# Patient Record
(demographics unavailable — no encounter records)

---

## 2024-12-04 NOTE — DISCUSSION/SUMMARY
[de-identified] : Mr Moore has symptomatic DJD In his right knee. He has had some improvement with a recent cortisone injection. He wiill resume PT for the next few weeks. IF unimproved we will consider an HA injection as a next step.  He will increase his activities as tolerated. all questions were answered. we will see him back in one month for reevaluation. He will call if any issues arise

## 2024-12-04 NOTE — HISTORY OF PRESENT ILLNESS
[de-identified] : Karan Moore is a 69 yo gentleman who presents with ongojng right knee issues since August. He developed a sudden onset of right knee pain and swelling. He had difficulty walking and was seen by Dr Cobian. He had his knee aspirated and was sent to PT which only caused increased pain. he had a cortisone injection three weeks ago which provided some relief He had an MRI which shows a medial meniscus tear and medial compartment DJD . He has had ongoing stiffness and limited mobility. He has been using a cane for support. He denies any locking or buckling. He has taken Tylenol without improvement. He is on blood thinners and cannot take Nsaids.

## 2024-12-04 NOTE — END OF VISIT
[FreeTextEntry3] : Dr Noel has reviewed the chart and agrees that the record accurately reflects his personal performance of the history, physical exam, assessment, and plan. He personally directed, reviewed, and agreed with the chart.All medical record  entries made by RADHA Van, acting as a scribe for this encounter under the direction of ARACELIS Stein

## 2024-12-04 NOTE — HISTORY OF PRESENT ILLNESS
[de-identified] : Karan Moore is a 67 yo gentleman who presents with ongojng right knee issues since August. He developed a sudden onset of right knee pain and swelling. He had difficulty walking and was seen by Dr Cobian. He had his knee aspirated and was sent to PT which only caused increased pain. he had a cortisone injection three weeks ago which provided some relief He had an MRI which shows a medial meniscus tear and medial compartment DJD . He has had ongoing stiffness and limited mobility. He has been using a cane for support. He denies any locking or buckling. He has taken Tylenol without improvement. He is on blood thinners and cannot take Nsaids.

## 2024-12-04 NOTE — PHYSICAL EXAM
[de-identified] : The patient is a well developed, well nourished male in no apparent distress. He is alert and oriented X 3 with a pleasant mood and appropriate affect.  On physical examination of the right knee, his ROm is 0-120 degrees. The patient walks with a normal gait and stands in neutral alignment. There is trace effusion. No warmth or erythema is noted. The patella is non tender to palpation medially or laterally. There is no crepitus noted. The apprehension and grind tests are negative. The extensor mechanism is intact. There is medial joint line tenderness. The Cal sign is positive. The Lachman and pivot shift tests are negative. There is no varus or valgus laxity at 0 or 30 degrees. No posterolateral or anteromedial laxity is noted. No masses are palpable. No other soft tissue or bony tenderness is noted. Quadriceps weakness is noted. Neurovascular function is intact. [de-identified] : MRI of the right knee shows a complex tear medial meniscus with medial compartment DJD

## 2024-12-04 NOTE — PHYSICAL EXAM
[de-identified] : The patient is a well developed, well nourished male in no apparent distress. He is alert and oriented X 3 with a pleasant mood and appropriate affect.  On physical examination of the right knee, his ROm is 0-120 degrees. The patient walks with a normal gait and stands in neutral alignment. There is trace effusion. No warmth or erythema is noted. The patella is non tender to palpation medially or laterally. There is no crepitus noted. The apprehension and grind tests are negative. The extensor mechanism is intact. There is medial joint line tenderness. The Cal sign is positive. The Lachman and pivot shift tests are negative. There is no varus or valgus laxity at 0 or 30 degrees. No posterolateral or anteromedial laxity is noted. No masses are palpable. No other soft tissue or bony tenderness is noted. Quadriceps weakness is noted. Neurovascular function is intact. [de-identified] : MRI of the right knee shows a complex tear medial meniscus with medial compartment DJD

## 2024-12-04 NOTE — REVIEW OF SYSTEMS
Detail Level: Simple Note Text (......Xxx Chief Complaint.): This diagnosis correlates with the Other (Free Text): Patient's condition is well-controlled with the periodic usage of hydrocortisone cream.  She was once again advised to avoid overusage of the medication and to take breaks in b/t treatment. [Joint Pain] : joint pain [Joint Stiffness] : joint stiffness [Joint Swelling] : joint swelling

## 2024-12-04 NOTE — DISCUSSION/SUMMARY
[de-identified] : Mr Moore has symptomatic DJD In his right knee. He has had some improvement with a recent cortisone injection. He wiill resume PT for the next few weeks. IF unimproved we will consider an HA injection as a next step.  He will increase his activities as tolerated. all questions were answered. we will see him back in one month for reevaluation. He will call if any issues arise